# Patient Record
Sex: FEMALE | Race: WHITE | Employment: FULL TIME | URBAN - METROPOLITAN AREA
[De-identification: names, ages, dates, MRNs, and addresses within clinical notes are randomized per-mention and may not be internally consistent; named-entity substitution may affect disease eponyms.]

---

## 2017-01-30 ENCOUNTER — HOSPITAL ENCOUNTER (OUTPATIENT)
Dept: PHYSICAL THERAPY | Age: 37
Discharge: HOME | End: 2017-01-31
Admitting: NEUROLOGICAL SURGERY

## 2017-02-16 ENCOUNTER — HOSPITAL ENCOUNTER (OUTPATIENT)
Dept: PHYSICAL THERAPY | Age: 37
Discharge: HOME | End: 2017-02-16
Admitting: NEUROLOGICAL SURGERY

## 2017-02-27 ENCOUNTER — HOSPITAL ENCOUNTER (OUTPATIENT)
Dept: PHYSICAL THERAPY | Age: 37
Discharge: HOME | End: 2017-02-27
Admitting: NEUROLOGICAL SURGERY

## 2017-03-08 ENCOUNTER — HOSPITAL ENCOUNTER (OUTPATIENT)
Dept: PHYSICAL THERAPY | Age: 37
Discharge: HOME | End: 2017-03-08
Admitting: NEUROLOGICAL SURGERY

## 2017-03-13 ENCOUNTER — HOSPITAL ENCOUNTER (OUTPATIENT)
Dept: PHYSICAL THERAPY | Age: 37
Discharge: HOME | End: 2017-03-13
Admitting: NEUROLOGICAL SURGERY

## 2017-03-15 ENCOUNTER — HOSPITAL ENCOUNTER (OUTPATIENT)
Dept: PHYSICAL THERAPY | Age: 37
Discharge: HOME | End: 2017-03-15
Admitting: NEUROLOGICAL SURGERY

## 2018-12-14 ENCOUNTER — APPOINTMENT (OUTPATIENT)
Dept: CT IMAGING | Age: 38
End: 2018-12-14
Payer: COMMERCIAL

## 2018-12-14 ENCOUNTER — HOSPITAL ENCOUNTER (EMERGENCY)
Age: 38
Discharge: HOME | End: 2018-12-14
Attending: EMERGENCY MEDICINE
Payer: COMMERCIAL

## 2018-12-14 VITALS
SYSTOLIC BLOOD PRESSURE: 130 MMHG | OXYGEN SATURATION: 97 % | HEIGHT: 64 IN | WEIGHT: 192 LBS | BODY MASS INDEX: 32.78 KG/M2 | HEART RATE: 80 BPM | TEMPERATURE: 98.1 F | RESPIRATION RATE: 18 BRPM | DIASTOLIC BLOOD PRESSURE: 87 MMHG

## 2018-12-14 DIAGNOSIS — M43.6 TORTICOLLIS: Primary | ICD-10-CM

## 2018-12-14 PROCEDURE — 96374 THER/PROPH/DIAG INJ IV PUSH: CPT

## 2018-12-14 PROCEDURE — 96375 TX/PRO/DX INJ NEW DRUG ADDON: CPT

## 2018-12-14 PROCEDURE — 6360000002 HC RX W HCPCS: Performed by: PHYSICIAN ASSISTANT

## 2018-12-14 PROCEDURE — 99283 EMERGENCY DEPT VISIT LOW MDM: CPT

## 2018-12-14 PROCEDURE — 6370000000 HC RX 637 (ALT 250 FOR IP): Performed by: PHYSICIAN ASSISTANT

## 2018-12-14 PROCEDURE — 72125 CT NECK SPINE W/O DYE: CPT

## 2018-12-14 RX ORDER — DIPHENHYDRAMINE HCL 25 MG
25 CAPSULE ORAL EVERY 4 HOURS PRN
Qty: 25 CAPSULE | Refills: 0 | Status: SHIPPED | OUTPATIENT
Start: 2018-12-14 | End: 2018-12-24

## 2018-12-14 RX ORDER — ORPHENADRINE CITRATE 30 MG/ML
60 INJECTION INTRAMUSCULAR; INTRAVENOUS ONCE
Status: DISCONTINUED | OUTPATIENT
Start: 2018-12-14 | End: 2018-12-14

## 2018-12-14 RX ORDER — CYCLOBENZAPRINE HCL 10 MG
10 TABLET ORAL 3 TIMES DAILY PRN
Qty: 20 TABLET | Refills: 0 | Status: SHIPPED | OUTPATIENT
Start: 2018-12-14 | End: 2018-12-21

## 2018-12-14 RX ORDER — LEVOTHYROXINE SODIUM 0.1 MG/1
100 TABLET ORAL DAILY
COMMUNITY
End: 2019-03-06 | Stop reason: SDUPTHER

## 2018-12-14 RX ORDER — KETOROLAC TROMETHAMINE 10 MG/1
10 TABLET, FILM COATED ORAL EVERY 6 HOURS PRN
Qty: 20 TABLET | Refills: 0 | Status: SHIPPED | OUTPATIENT
Start: 2018-12-14 | End: 2019-01-23

## 2018-12-14 RX ORDER — METHYLPREDNISOLONE 4 MG/1
TABLET ORAL
Qty: 1 KIT | Refills: 0 | Status: SHIPPED | OUTPATIENT
Start: 2018-12-14 | End: 2018-12-20

## 2018-12-14 RX ORDER — DIPHENHYDRAMINE HYDROCHLORIDE 50 MG/ML
12.5 INJECTION INTRAMUSCULAR; INTRAVENOUS ONCE
Status: COMPLETED | OUTPATIENT
Start: 2018-12-14 | End: 2018-12-14

## 2018-12-14 RX ORDER — KETOROLAC TROMETHAMINE 30 MG/ML
30 INJECTION, SOLUTION INTRAMUSCULAR; INTRAVENOUS ONCE
Status: COMPLETED | OUTPATIENT
Start: 2018-12-14 | End: 2018-12-14

## 2018-12-14 RX ORDER — CYCLOBENZAPRINE HCL 10 MG
10 TABLET ORAL ONCE
Status: COMPLETED | OUTPATIENT
Start: 2018-12-14 | End: 2018-12-14

## 2018-12-14 RX ADMIN — CYCLOBENZAPRINE HYDROCHLORIDE 10 MG: 10 TABLET, FILM COATED ORAL at 17:08

## 2018-12-14 RX ADMIN — KETOROLAC TROMETHAMINE 30 MG: 30 INJECTION, SOLUTION INTRAMUSCULAR at 17:05

## 2018-12-14 RX ADMIN — DIPHENHYDRAMINE HYDROCHLORIDE 12.5 MG: 50 INJECTION, SOLUTION INTRAMUSCULAR; INTRAVENOUS at 17:05

## 2018-12-14 ASSESSMENT — PAIN SCALES - GENERAL
PAINLEVEL_OUTOF10: 10
PAINLEVEL_OUTOF10: 8

## 2018-12-14 ASSESSMENT — PAIN DESCRIPTION - ORIENTATION: ORIENTATION: RIGHT

## 2018-12-14 ASSESSMENT — PAIN DESCRIPTION - LOCATION: LOCATION: NECK

## 2018-12-14 NOTE — ED PROVIDER NOTES
[address] ED  eMERGENCY dEPARTMENT eNCOUnter        Pt Name: Brien Chiang  MRN: [id number]  [place] 1980  Date of evaluation: 12/14/2018  Provider: Artem Andrea PA-C  PCP: Karyn Rodríguez MD    This patient WAS seen and evaluated by the attending physician Erin Souza MD.            [address] [facility]       Chief Complaint   Patient presents with    Neck Pain     Pt c/o neck pain that started yesterday and has just gotten worse, Pt was sent from pcp to evaluate here. Pt cannot move neck and cannot lift arms. Pt's head is slight turned toward the left. HISTORY OF PRESENT ILLNESS   (Location/Symptom, Timing/Onset, Context/Setting, Quality, Duration, Modifying Factors, Severity)  Note limiting factors. Brien Chiang is a 41 y.o. female presents to the emergency department with right-sided neck pain that has been present since yesterday around 3 PM.  The patient denies any strenuous activity or known injury. She states that her pain is an 8 out of 10 sharp pain that is worse with movement or when it spasms. She has had 2 disc replacements 2 years ago. She states she went to her PCP who referred her here. She has taken over-the-counter medications without any relief. Denies any numbness, tingling, weakness, saddle paresthesias, bowel or bladder incontinence/retention, recent spine surgery, diabetes and recreational drugs. Nursing Notes were all reviewed and agreed with or any disagreements were addressed  in the HPI. REVIEW OF SYSTEMS    (2-9 systems for level 4, 10 or more for level 5)     Review of Systems    Positives and Pertinent negatives as per HPI. Except as noted abovein the ROS, all other systems were reviewed and negative.        PAST MEDICAL HISTORY     Past Medical History:   Diagnosis Date    Anxiety     Herniated cervical disc     MRSA infection     on back 2010         SURGICAL HISTORY     Past Surgical History:   Procedure Laterality Date eye exhibits no discharge. Left eye exhibits no discharge. No scleral icterus. Neck: Normal range of motion. Neck supple. Cardiovascular: Normal rate, regular rhythm, normal heart sounds and intact distal pulses. Exam reveals no gallop and no friction rub. No murmur heard. Pulmonary/Chest: Effort normal and breath sounds normal. No respiratory distress. She has no wheezes. She has no rales. Musculoskeletal:        Cervical back: She exhibits decreased range of motion, tenderness (Along the right sternocleidomastoid), pain and spasm (Right sternocleidomastoid). She exhibits no bony tenderness, no swelling, no edema, no deformity and no laceration. Thoracic back: Normal.        Lumbar back: Normal.   Neurological: She is alert and oriented to person, place, and time. She has normal strength. She displays no atrophy and no tremor. No sensory deficit. She exhibits normal muscle tone. She displays no seizure activity. Gait normal.   Reflex Scores:       Tricep reflexes are 2+ on the right side and 2+ on the left side. Bicep reflexes are 2+ on the right side and 2+ on the left side. Brachioradialis reflexes are 2+ on the right side and 2+ on the left side. Patellar reflexes are 2+ on the right side and 2+ on the left side. Achilles reflexes are 2+ on the right side and 2+ on the left side. Skin: Skin is warm and dry. She is not diaphoretic. No pallor. Psychiatric: She has a normal mood and affect. Her behavior is normal. Thought content normal.   Nursing note and vitals reviewed. DIAGNOSTIC RESULTS   LABS:    Labs Reviewed - No data to display    All other labs were within normal range or not returned as of this dictation. EKG: All EKG's are interpreted by the Emergency Department Physician who either signs orCo-signs this chart in the absence of a cardiologist.  Please see their note for interpretation of EKG.       RADIOLOGY:   Non-plain film images such as CT, Ultrasound and MRI are read by the lesly Krause All radiographic images are visualized andpreliminarily interpreted by the  ED Provider with the below findings:        Interpretation perthe Radiologist below, if available at the time of this note:    CT Cervical Spine WO Contrast   Final Result   Postoperative change of the cervical spine at C4-C5 and C5-C6. No evidence   of spondylolisthesis or gross fracture. Ct Cervical Spine Wo Contrast    Result Date: 12/14/2018  EXAMINATION: CT OF THE CERVICAL SPINE WITHOUT CONTRAST 12/14/2018 5:01 pm TECHNIQUE: CT of the cervical spine was performed without the administration of intravenous contrast. Multiplanar reformatted images are provided for review. Dose modulation, iterative reconstruction, and/or weight based adjustment of the mA/kV was utilized to reduce the radiation dose to as low as reasonably achievable. COMPARISON: None. HISTORY: ORDERING SYSTEM PROVIDED HISTORY: right sided neck pain TECHNOLOGIST PROVIDED HISTORY: If patient is on cardiac monitor and/or pulse ox, they may be taken off cardiac monitor and pulse ox, left on O2 if currently on. All monitors reattached when patient returns to room. Ordering Physician Provided Reason for Exam: right sided neck pain, pt denies any injury Acuity: Acute Type of Exam: Initial Relevant Medical/Surgical History: neck sx x 2 yrs ago FINDINGS: BONES/ALIGNMENT: Multiple contiguous axial images were obtained of the cervical spine from the skullbase through T2. Sagittal and coronal reformats were obtained. Radiopaque disc spacers are seen at C4-C5 and C5-C6 from prior surgery. No evidence of spondylolisthesis. No marked vertebral body height loss. Spurring is seen at the interface of the dens and the anterior arch of C1. The facets are aligned. No gross fracture is seen. SOFT TISSUES: No airspace disease of the lung apices. Shotty cervical lymph nodes are seen bilaterally.      Postoperative change of the

## 2018-12-16 NOTE — ED PROVIDER NOTES
I independently performed a history and physical on Leita Leyden. All diagnostic, treatment, and disposition decisions were made by myself in conjunction with the advanced practice provider. Briefly, this is a 41 y.o. female here for pain to the right side of the neck. Patient states that she is limited to the right. Patient denies any injury. .    On exam, patient has a benign physical examination, and we did provide the patient with medications to help with her symptoms including antihistamines. Patient was improved and will be started on medications to help at home    For further details of [facility] emergency department encounter, please see documentation by advanced practice provider  Pal Carrion.         Leda Brock MD  12/16/18 [id number]

## 2019-01-23 ENCOUNTER — OFFICE VISIT (OUTPATIENT)
Dept: ENDOCRINOLOGY | Age: 39
End: 2019-01-23
Payer: COMMERCIAL

## 2019-01-23 VITALS
SYSTOLIC BLOOD PRESSURE: 124 MMHG | OXYGEN SATURATION: 98 % | RESPIRATION RATE: 14 BRPM | BODY MASS INDEX: 33.49 KG/M2 | DIASTOLIC BLOOD PRESSURE: 81 MMHG | HEART RATE: 75 BPM | TEMPERATURE: 97.2 F | HEIGHT: 64 IN | WEIGHT: 196.2 LBS

## 2019-01-23 DIAGNOSIS — E01.0 THYROMEGALY: ICD-10-CM

## 2019-01-23 DIAGNOSIS — E03.9 ACQUIRED HYPOTHYROIDISM: Primary | ICD-10-CM

## 2019-01-23 PROCEDURE — 1036F TOBACCO NON-USER: CPT | Performed by: INTERNAL MEDICINE

## 2019-01-23 PROCEDURE — G8417 CALC BMI ABV UP PARAM F/U: HCPCS | Performed by: INTERNAL MEDICINE

## 2019-01-23 PROCEDURE — G8427 DOCREV CUR MEDS BY ELIG CLIN: HCPCS | Performed by: INTERNAL MEDICINE

## 2019-01-23 PROCEDURE — 99203 OFFICE O/P NEW LOW 30 MIN: CPT | Performed by: INTERNAL MEDICINE

## 2019-01-23 PROCEDURE — G8484 FLU IMMUNIZE NO ADMIN: HCPCS | Performed by: INTERNAL MEDICINE

## 2019-02-23 ENCOUNTER — HOSPITAL ENCOUNTER (OUTPATIENT)
Dept: ULTRASOUND IMAGING | Age: 39
Discharge: HOME | End: 2019-02-23
Payer: COMMERCIAL

## 2019-02-23 ENCOUNTER — HOSPITAL ENCOUNTER (OUTPATIENT)
Age: 39
Discharge: HOME | End: 2019-02-23
Payer: COMMERCIAL

## 2019-02-23 DIAGNOSIS — E03.9 ACQUIRED HYPOTHYROIDISM: ICD-10-CM

## 2019-02-23 DIAGNOSIS — E01.0 THYROMEGALY: ICD-10-CM

## 2019-02-23 LAB
T4 FREE: 1.5 NG/DL (ref 0.9–1.8)
THYROID PEROXIDASE (TPO) ABS: 227 IU/ML
TSH SERPL DL<=0.05 MIU/L-ACNC: 0.48 UIU/ML (ref 0.27–4.2)

## 2019-02-23 PROCEDURE — 36415 COLL VENOUS BLD VENIPUNCTURE: CPT

## 2019-02-23 PROCEDURE — 84443 ASSAY THYROID STIM HORMONE: CPT

## 2019-02-23 PROCEDURE — 76536 US EXAM OF HEAD AND NECK: CPT

## 2019-02-23 PROCEDURE — 86376 MICROSOMAL ANTIBODY EACH: CPT

## 2019-02-23 PROCEDURE — 84439 ASSAY OF FREE THYROXINE: CPT

## 2019-02-27 ENCOUNTER — TELEPHONE (OUTPATIENT)
Dept: ENDOCRINOLOGY | Age: 39
End: 2019-02-27

## 2019-03-06 ENCOUNTER — OFFICE VISIT (OUTPATIENT)
Dept: ENDOCRINOLOGY | Age: 39
End: 2019-03-06
Payer: COMMERCIAL

## 2019-03-06 VITALS
SYSTOLIC BLOOD PRESSURE: 137 MMHG | HEART RATE: 81 BPM | HEIGHT: 64 IN | OXYGEN SATURATION: 100 % | WEIGHT: 194.6 LBS | BODY MASS INDEX: 33.22 KG/M2 | DIASTOLIC BLOOD PRESSURE: 84 MMHG

## 2019-03-06 DIAGNOSIS — E66.9 CLASS 1 OBESITY WITH SERIOUS COMORBIDITY AND BODY MASS INDEX (BMI) OF 34.0 TO 34.9 IN ADULT, UNSPECIFIED OBESITY TYPE: ICD-10-CM

## 2019-03-06 DIAGNOSIS — E03.9 ACQUIRED HYPOTHYROIDISM: Primary | ICD-10-CM

## 2019-03-06 DIAGNOSIS — E55.9 VITAMIN D DEFICIENCY: ICD-10-CM

## 2019-03-06 PROCEDURE — G8484 FLU IMMUNIZE NO ADMIN: HCPCS | Performed by: NURSE PRACTITIONER

## 2019-03-06 PROCEDURE — G8427 DOCREV CUR MEDS BY ELIG CLIN: HCPCS | Performed by: NURSE PRACTITIONER

## 2019-03-06 PROCEDURE — 99203 OFFICE O/P NEW LOW 30 MIN: CPT | Performed by: NURSE PRACTITIONER

## 2019-03-06 PROCEDURE — 1036F TOBACCO NON-USER: CPT | Performed by: NURSE PRACTITIONER

## 2019-03-06 PROCEDURE — G8417 CALC BMI ABV UP PARAM F/U: HCPCS | Performed by: NURSE PRACTITIONER

## 2019-03-06 RX ORDER — LEVOTHYROXINE SODIUM 0.1 MG/1
100 TABLET ORAL DAILY
Qty: 90 TABLET | Refills: 2 | Status: SHIPPED | OUTPATIENT
Start: 2019-03-06 | End: 2020-03-10 | Stop reason: SDUPTHER

## 2019-03-07 ASSESSMENT — ENCOUNTER SYMPTOMS
COLOR CHANGE: 0
ABDOMINAL PAIN: 0
EYE PAIN: 0
BACK PAIN: 0
SHORTNESS OF BREATH: 0
CONSTIPATION: 0
DIARRHEA: 0

## 2019-04-01 ENCOUNTER — HOSPITAL ENCOUNTER (OUTPATIENT)
Age: 39
Discharge: HOME | End: 2019-04-01
Payer: COMMERCIAL

## 2019-04-01 DIAGNOSIS — E66.9 CLASS 1 OBESITY WITH SERIOUS COMORBIDITY AND BODY MASS INDEX (BMI) OF 34.0 TO 34.9 IN ADULT, UNSPECIFIED OBESITY TYPE: ICD-10-CM

## 2019-04-01 DIAGNOSIS — E55.9 VITAMIN D DEFICIENCY: ICD-10-CM

## 2019-04-01 LAB
A/G RATIO: 1.2 (ref 1.1–2.2)
ALBUMIN SERPL-MCNC: 4.1 G/DL (ref 3.4–5)
ALP BLD-CCNC: 72 U/L (ref 40–129)
ALT SERPL-CCNC: 14 U/L (ref 10–40)
ANION GAP SERPL CALCULATED.3IONS-SCNC: 10 MMOL/L (ref 3–16)
AST SERPL-CCNC: 14 U/L (ref 15–37)
BILIRUB SERPL-MCNC: 0.3 MG/DL (ref 0–1)
BUN BLDV-MCNC: 16 MG/DL (ref 7–20)
CALCIUM SERPL-MCNC: 9 MG/DL (ref 8.3–10.6)
CHLORIDE BLD-SCNC: 107 MMOL/L (ref 99–110)
CHOLESTEROL, TOTAL: 178 MG/DL (ref 0–199)
CO2: 21 MMOL/L (ref 21–32)
CREAT SERPL-MCNC: <0.5 MG/DL (ref 0.6–1.1)
ESTIMATED AVERAGE GLUCOSE: 96.8 MG/DL
GFR AFRICAN AMERICAN: >60
GFR NON-AFRICAN AMERICAN: >60
GLOBULIN: 3.3 G/DL
GLUCOSE BLD-MCNC: 99 MG/DL (ref 70–99)
HBA1C MFR BLD: 5 %
HDLC SERPL-MCNC: 46 MG/DL (ref 40–60)
LDL CHOLESTEROL CALCULATED: 118 MG/DL
POTASSIUM SERPL-SCNC: 3.8 MMOL/L (ref 3.5–5.1)
SODIUM BLD-SCNC: 138 MMOL/L (ref 136–145)
TOTAL PROTEIN: 7.4 G/DL (ref 6.4–8.2)
TRIGL SERPL-MCNC: 70 MG/DL (ref 0–150)
VITAMIN D 25-HYDROXY: 25.5 NG/ML
VLDLC SERPL CALC-MCNC: 14 MG/DL

## 2019-04-01 PROCEDURE — 80053 COMPREHEN METABOLIC PANEL: CPT

## 2019-04-01 PROCEDURE — 82306 VITAMIN D 25 HYDROXY: CPT

## 2019-04-01 PROCEDURE — 83036 HEMOGLOBIN GLYCOSYLATED A1C: CPT

## 2019-04-01 PROCEDURE — 80061 LIPID PANEL: CPT

## 2019-04-01 PROCEDURE — 36415 COLL VENOUS BLD VENIPUNCTURE: CPT

## 2019-04-03 ENCOUNTER — OFFICE VISIT (OUTPATIENT)
Dept: ENDOCRINOLOGY | Age: 39
End: 2019-04-03
Payer: COMMERCIAL

## 2019-04-03 VITALS
RESPIRATION RATE: 16 BRPM | OXYGEN SATURATION: 98 % | DIASTOLIC BLOOD PRESSURE: 86 MMHG | SYSTOLIC BLOOD PRESSURE: 132 MMHG | HEIGHT: 64 IN | HEART RATE: 102 BPM | BODY MASS INDEX: 31.41 KG/M2 | WEIGHT: 184 LBS

## 2019-04-03 DIAGNOSIS — E78.49 OTHER HYPERLIPIDEMIA: ICD-10-CM

## 2019-04-03 DIAGNOSIS — E66.9 CLASS 1 OBESITY WITH SERIOUS COMORBIDITY IN ADULT, UNSPECIFIED BMI, UNSPECIFIED OBESITY TYPE: ICD-10-CM

## 2019-04-03 DIAGNOSIS — E55.9 VITAMIN D DEFICIENCY: ICD-10-CM

## 2019-04-03 DIAGNOSIS — E03.9 ACQUIRED HYPOTHYROIDISM: Primary | ICD-10-CM

## 2019-04-03 PROCEDURE — 99213 OFFICE O/P EST LOW 20 MIN: CPT | Performed by: NURSE PRACTITIONER

## 2019-04-03 PROCEDURE — G8417 CALC BMI ABV UP PARAM F/U: HCPCS | Performed by: NURSE PRACTITIONER

## 2019-04-03 PROCEDURE — G8427 DOCREV CUR MEDS BY ELIG CLIN: HCPCS | Performed by: NURSE PRACTITIONER

## 2019-04-03 PROCEDURE — 1036F TOBACCO NON-USER: CPT | Performed by: NURSE PRACTITIONER

## 2019-04-03 RX ORDER — PHENTERMINE HYDROCHLORIDE 37.5 MG/1
37.5 TABLET ORAL
Qty: 30 TABLET | Refills: 0 | Status: SHIPPED | OUTPATIENT
Start: 2019-04-03 | End: 2019-04-24 | Stop reason: SDUPTHER

## 2019-04-03 ASSESSMENT — ENCOUNTER SYMPTOMS
COLOR CHANGE: 0
ABDOMINAL PAIN: 0
DIARRHEA: 0
EYE PAIN: 0
BACK PAIN: 0
CONSTIPATION: 0
SHORTNESS OF BREATH: 0

## 2019-04-03 NOTE — ASSESSMENT & PLAN NOTE
Lab Results   Component Value Date    TSH 0.48 02/23/2019    T4FREE 1.5 02/23/2019     Continue at same dose  Will review labs at next visit with Dr Maria Isabel Canela

## 2019-04-03 NOTE — PROGRESS NOTES
Endocrinology  [place], [place]  Phone: [phone]   FAX: [phone]    Khai Melvin is a 41 y.o. female who presents for evaluation of  hypothyroidism. Thyroid:   Khai Melvin has a h/o hypothyroidism for > 2 years. Referring Provider: Chelo Ybarra MD     Last A1C:   Lab Results   Component Value Date    LABA1C 5.0 04/01/2019     Last BP Readings:   BP Readings from Last 3 Encounters:   04/03/19 132/86   03/13/19 126/82   03/06/19 137/84     Last LDL:   Lab Results   Component Value Date    LDLCALC 118 (H) 04/01/2019     Aspirin Use: no    Tobacco/Alcohol History:   Tobacco Use    Smoking status: Former Smoker     Packs/day: 0.25     Years: 17.00     Pack years: 4.25     Types: Cigarettes     Start date: 5/1/2014    Smokeless tobacco: Never Used   Substance and Sexual Activity    Alcohol use: Yes     Alcohol/week: 7.2 oz     Types: 12 Cans of beer per week     Comment: 1-2 times a month states drank 1 glass wine 6/25/13 evening and prior to that 3 months ago    Drug use: No     Comment: used pot as teen tried cocaine as teen     Patient has been on varying dosages of levothyroxine and is currently on levothyroxine 100 mcg. Confirms that it is taken in early am on an empty stomach. Clarified that nothing to eat for at least 30 minutes after and no iron or calcium for at.least 3-4 hours after. Diagnosis was intially during last pregnancy but was euthyroid shortly after and has been conssitently taking eds for 2 years     Current symptoms include fatigue, weight gain, constipation, change in skin,  nails, or hair, goiter. Reports a near 40 lbs weight gain over the past 2 years that has not responded to dietary management. --> reports a 10+ lbs weight loss in the past 3 weeks  Patient denies swelling, anxiousness, feeling excessive energy, tremulousness, palpitations, weight loss, diarrhea, heat intolerance.      Obstructive symptoms  - Change in voice no  - Trouble swallowing no    Predisposing factors for thyroid disease  Exposure to radiation (neck) : no  Exposure to iodine : no  FH of thyroid disease : no  FH of thyroid or other cancers: no     Dietary Regimen  BF: eggs/coffee  Lunch: skips/late breakfast  Dinner: protein/vegetables/carbs  Beverages: coffee/water/rarely alcohol/  Snacks: nuts    Lab Results   Component Value Date    TSH 0.48 02/23/2019    TSH 3.69 06/05/2014    T4FREE 1.5 02/23/2019    T4FREE 1.1 06/05/2014     Component      Latest Ref Rng & Units 2/23/2019   THYROID PEROXIDASE (TPO) ABS      <34 IU/mL 227 (H)     EXAMINATION:  THYROID ULTRASOUND 2/23/2019    COMPARISON: None    HISTORY:  ORDERING SYSTEM PROVIDED HISTORY: Acquired hypothyroidism   TECHNOLOGIST PROVIDED HISTORY:  Reason for exam:->Hypothyroidism. Thyromegaly. FINDINGS:  Right thyroid lobe:  4.0 x 1.5 x 1.2 cm  Left thyroid lobe:  4.2 x 1.3 x 1.1 cm  Isthmus:  3 mm  Thyroid Gland:  Thyroid gland demonstrates normal echotexture and vascularity. Nodules: No thyroid nodules are present. Cervical lymphadenopathy: No abnormal lymph nodes in the imaged portions of  the neck. Impression   Unremarkable thyroid ultrasound. Vitamin D deficiency  Ongoing concerns with fatigue and insomnia  Component      Latest Ref Rng & Units 4/1/2019   Vit D, 25-Hydroxy      >=30 ng/mL 25.5 (L)     Hyperlipidemia  Not on a statin currently, monitoring per her diet currently    Component      Latest Ref Rng & Units 4/1/2019 6/5/2014   Cholesterol, Total      0 - 199 mg/dL 178 178   Triglycerides      0 - 150 mg/dL 70 65   HDL Cholesterol      40 - 60 mg/dL 46 65 (H)   LDL Calculated      <100 mg/dL 118 (H) 100 (H)   VLDL Cholesterol Calculated      Not Established mg/dL 14 13     Past Medical History:   Diagnosis Date    Anxiety     Herniated cervical disc     MRSA infection     on back 2010     No family history on file.   Current Outpatient Medications   Medication Sig Dispense Refill    phentermine (ADIPEX-P) 37.5 MG tablet Take 1 tablet by mouth every morning (before breakfast) for 30 days. 30 tablet 0    phentermine (ADIPEX-P) 37.5 MG tablet Take 1 tablet by mouth every morning (before breakfast) for 30 days. 30 tablet 0    levothyroxine (SYNTHROID) 100 MCG tablet Take 1 tablet by mouth Daily 90 tablet 2     No current facility-administered medications for this visit. Review of Systems   Constitutional: Negative for activity change, appetite change, diaphoresis, fatigue and unexpected weight change. Eyes: Negative for pain and visual disturbance. Respiratory: Negative for shortness of breath. Cardiovascular: Negative for palpitations and leg swelling. Gastrointestinal: Negative for abdominal pain, constipation and diarrhea. Endocrine: Negative for cold intolerance, heat intolerance, polydipsia, polyphagia and polyuria. Musculoskeletal: Negative for back pain, gait problem, myalgias and neck pain. Skin: Negative for color change and pallor. Neurological: Negative for dizziness, weakness, light-headedness and headaches. Hematological: Does not bruise/bleed easily. Psychiatric/Behavioral: Negative for sleep disturbance. The patient is not nervous/anxious and is not hyperactive. Vitals:    04/03/19 1136   BP: 132/86   Site: Left Upper Arm   Position: Sitting   Pulse: 102   Resp: 16   SpO2: 98%   Weight: 184 lb (83.5 kg)   Height: 5' 4\" (1.626 m)     Physical Exam   Constitutional: She is oriented to person, place, and time. She appears well-developed and well-nourished. HENT:   Head: Normocephalic and atraumatic. Eyes: Pupils are equal, round, and reactive to light. Conjunctivae and EOM are normal.   No evidence of proptosis   Neck: Normal range of motion. Neck supple. No thyromegaly present. Cardiovascular: Normal rate and regular rhythm. Pulmonary/Chest: Effort normal and breath sounds normal.   Abdominal: Soft. Bowel sounds are normal.   Musculoskeletal: Normal range of motion.  She exhibits no edema or tenderness. Neurological: She is alert and oriented to person, place, and time. No tremors noted on outstretched arms   Skin: Skin is warm and dry. She is not diaphoretic. No skin changes b/l shins   Psychiatric: She has a normal mood and affect. Her behavior is normal. Judgment and thought content normal.     Assessment  Maria Elena Feliciano is a 41 y.o. female with abnormal thyroid test:   Previous test were normal, will repeat evaluation by checking thyroid test and antibodies  Interested in weight loss advice: discussed starting a low carb protocol  Will review labs, and medical records, currently not available from [facility] doing well on Phentermine and documents a 10 + lbs weight loss in 3 weeks via diet and medication    Plan  Problem List Items Addressed This Visit     Acquired hypothyroidism - Primary     Lab Results   Component Value Date    TSH 0.48 02/23/2019    T4FREE 1.5 02/23/2019     Continue at same dose  Will review labs at next visit with Dr Yarelis Cabrera            Relevant Medications    phentermine (ADIPEX-P) 37.5 MG tablet    Obesity with serious comorbidity    Relevant Medications    phentermine (ADIPEX-P) 37.5 MG tablet    Other hyperlipidemia    Relevant Medications    phentermine (ADIPEX-P) 37.5 MG tablet    Vitamin D deficiency      Greater than 20 minutes spent directly counseling patient about topics listed above (such as lifestyle modifications, preventative screenings and/or disease related processes)    Return in about 1 month (around 5/3/2019).

## 2019-04-22 ENCOUNTER — TELEPHONE (OUTPATIENT)
Dept: ENDOCRINOLOGY | Age: 39
End: 2019-04-22

## 2019-04-22 NOTE — TELEPHONE ENCOUNTER
Patient is having a drug test for work and is asking that we write a letter that she is under our care and that she is being prescribed Adipex. She will call back later if this needs to be faxed. If not, she is asking to pick this up at her Wednesday appointment in [place].

## 2019-04-24 ENCOUNTER — OFFICE VISIT (OUTPATIENT)
Dept: ENDOCRINOLOGY | Age: 39
End: 2019-04-24
Payer: COMMERCIAL

## 2019-04-24 VITALS
SYSTOLIC BLOOD PRESSURE: 122 MMHG | DIASTOLIC BLOOD PRESSURE: 81 MMHG | OXYGEN SATURATION: 98 % | HEIGHT: 64 IN | WEIGHT: 177.2 LBS | BODY MASS INDEX: 30.25 KG/M2 | HEART RATE: 75 BPM

## 2019-04-24 DIAGNOSIS — E03.9 ACQUIRED HYPOTHYROIDISM: ICD-10-CM

## 2019-04-24 DIAGNOSIS — E66.9 CLASS 1 OBESITY WITH SERIOUS COMORBIDITY IN ADULT, UNSPECIFIED BMI, UNSPECIFIED OBESITY TYPE: ICD-10-CM

## 2019-04-24 DIAGNOSIS — E78.49 OTHER HYPERLIPIDEMIA: Primary | ICD-10-CM

## 2019-04-24 PROCEDURE — G8417 CALC BMI ABV UP PARAM F/U: HCPCS | Performed by: NURSE PRACTITIONER

## 2019-04-24 PROCEDURE — 99213 OFFICE O/P EST LOW 20 MIN: CPT | Performed by: NURSE PRACTITIONER

## 2019-04-24 PROCEDURE — G8427 DOCREV CUR MEDS BY ELIG CLIN: HCPCS | Performed by: NURSE PRACTITIONER

## 2019-04-24 PROCEDURE — 1036F TOBACCO NON-USER: CPT | Performed by: NURSE PRACTITIONER

## 2019-04-24 RX ORDER — PHENTERMINE HYDROCHLORIDE 37.5 MG/1
37.5 TABLET ORAL
Qty: 30 TABLET | Refills: 0 | Status: SHIPPED | OUTPATIENT
Start: 2019-04-24 | End: 2019-05-24

## 2019-04-24 ASSESSMENT — ENCOUNTER SYMPTOMS
ABDOMINAL PAIN: 0
BACK PAIN: 0
CONSTIPATION: 0
SHORTNESS OF BREATH: 0
DIARRHEA: 0
EYE PAIN: 0
COLOR CHANGE: 0

## 2019-04-24 NOTE — PROGRESS NOTES
Endocrinology  [place], [place]  Phone: [phone]   FAX: [phone]    Manasa Velasquez is a 41 y.o. female who presents for evaluation of  hypothyroidism. Thyroid:   Manasa Velasquez has a h/o hypothyroidism for > 2 years. Referring Provider: Meghna Macedo MD     Last A1C:   Lab Results   Component Value Date    LABA1C 5.0 04/01/2019     Last BP Readings:   BP Readings from Last 3 Encounters:   04/24/19 122/81   04/03/19 132/86   03/13/19 126/82     Last LDL:   Lab Results   Component Value Date    LDLCALC 118 (H) 04/01/2019     Aspirin Use: no    Tobacco/Alcohol History:   Tobacco Use    Smoking status: Former Smoker     Packs/day: 0.25     Years: 17.00     Pack years: 4.25     Types: Cigarettes     Start date: 5/1/2014    Smokeless tobacco: Never Used   Substance and Sexual Activity    Alcohol use: Yes     Alcohol/week: 7.2 oz     Types: 12 Cans of beer per week     Comment: 1-2 times a month states drank 1 glass wine 6/25/13 evening and prior to that 3 months ago    Drug use: No     Comment: used pot as teen tried cocaine as teen     Patient has been on varying dosages of levothyroxine and is currently on levothyroxine 100 mcg. Confirms that it is taken in early am on an empty stomach. Clarified that nothing to eat for at least 30 minutes after and no iron or calcium for at.least 3-4 hours after. Diagnosis was intially during last pregnancy but was euthyroid shortly after and has been conssitently taking eds for 2 years     Current symptoms include fatigue, weight gain, constipation, change in skin,  nails, or hair, goiter. Reports a near 40 lbs weight gain over the past 2 years that has not responded to dietary management. --> reports a 10+ lbs weight loss in the past 3 weeks  Patient denies swelling, anxiousness, feeling excessive energy, tremulousness, palpitations, weight loss, diarrhea, heat intolerance.      Obstructive symptoms  - Change in voice no  - Trouble swallowing no    Predisposing factors for thyroid disease  Exposure to radiation (neck) : no  Exposure to iodine : no  FH of thyroid disease : no  FH of thyroid or other cancers: no     Dietary Regimen  BF: eggs/coffee  Lunch: skips/late breakfast  Dinner: protein/vegetables/carbs  Beverages: coffee/water/rarely alcohol/  Snacks: nuts    Lab Results   Component Value Date    TSH 0.48 02/23/2019    TSH 3.69 06/05/2014    T4FREE 1.5 02/23/2019    T4FREE 1.1 06/05/2014     Component      Latest Ref Rng & Units 2/23/2019   THYROID PEROXIDASE (TPO) ABS      <34 IU/mL 227 (H)     EXAMINATION:  THYROID ULTRASOUND 2/23/2019    COMPARISON: None    HISTORY:  ORDERING SYSTEM PROVIDED HISTORY: Acquired hypothyroidism   TECHNOLOGIST PROVIDED HISTORY:  Reason for exam:->Hypothyroidism. Thyromegaly. FINDINGS:  Right thyroid lobe:  4.0 x 1.5 x 1.2 cm  Left thyroid lobe:  4.2 x 1.3 x 1.1 cm  Isthmus:  3 mm  Thyroid Gland:  Thyroid gland demonstrates normal echotexture and vascularity. Nodules: No thyroid nodules are present. Cervical lymphadenopathy: No abnormal lymph nodes in the imaged portions of  the neck. Impression   Unremarkable thyroid ultrasound. Vitamin D deficiency  Ongoing concerns with fatigue and insomnia  Component      Latest Ref Rng & Units 4/1/2019   Vit D, 25-Hydroxy      >=30 ng/mL 25.5 (L)     Hyperlipidemia  Not on a statin currently, monitoring per her diet currently    Component      Latest Ref Rng & Units 4/1/2019 6/5/2014   Cholesterol, Total      0 - 199 mg/dL 178 178   Triglycerides      0 - 150 mg/dL 70 65   HDL Cholesterol      40 - 60 mg/dL 46 65 (H)   LDL Calculated      <100 mg/dL 118 (H) 100 (H)   VLDL Cholesterol Calculated      Not Established mg/dL 14 13     Past Medical History:   Diagnosis Date    Anxiety     Herniated cervical disc     MRSA infection     on back 2010     No family history on file.   Current Outpatient Medications   Medication Sig Dispense Refill    phentermine (ADIPEX-P) 37.5 MG tablet Take 1 tablet by mouth every morning (before breakfast) for 30 days. 30 tablet 0    levothyroxine (SYNTHROID) 100 MCG tablet Take 1 tablet by mouth Daily 90 tablet 2     No current facility-administered medications for this visit. Review of Systems   Constitutional: Negative for activity change, appetite change, diaphoresis, fatigue and unexpected weight change. Eyes: Negative for pain and visual disturbance. Respiratory: Negative for shortness of breath. Cardiovascular: Negative for palpitations and leg swelling. Gastrointestinal: Negative for abdominal pain, constipation and diarrhea. Endocrine: Negative for cold intolerance, heat intolerance, polydipsia, polyphagia and polyuria. Musculoskeletal: Negative for back pain, gait problem, myalgias and neck pain. Skin: Negative for color change and pallor. Neurological: Negative for dizziness, weakness, light-headedness and headaches. Hematological: Does not bruise/bleed easily. Psychiatric/Behavioral: Negative for sleep disturbance. The patient is not nervous/anxious and is not hyperactive. Vitals:    04/24/19 1356   BP: 122/81   Site: Left Upper Arm   Position: Sitting   Cuff Size: Large Adult   Pulse: 75   SpO2: 98%   Weight: 177 lb 3.2 oz (80.4 kg)   Height: 5' 4\" (1.626 m)     Physical Exam   Constitutional: She is oriented to person, place, and time. She appears well-developed and well-nourished. HENT:   Head: Normocephalic and atraumatic. Eyes: Pupils are equal, round, and reactive to light. Conjunctivae and EOM are normal.   No evidence of proptosis   Neck: Normal range of motion. Neck supple. No thyromegaly present. Cardiovascular: Normal rate and regular rhythm. Pulmonary/Chest: Effort normal and breath sounds normal.   Abdominal: Soft. Bowel sounds are normal.   Musculoskeletal: Normal range of motion. She exhibits no edema or tenderness. Neurological: She is alert and oriented to person, place, and time.    No tremors noted on outstretched arms   Skin: Skin is warm and dry. She is not diaphoretic. No skin changes b/l shins   Psychiatric: She has a normal mood and affect. Her behavior is normal. Judgment and thought content normal.     Assessment  Lucila Wells is a 41 y.o. female with abnormal thyroid test:   Previous test were normal, will repeat evaluation by checking thyroid test and antibodies  Interested in weight loss advice: discussed starting a low carb protocol  Will review labs, and medical records, currently not available from [address] [place] [address] [place]  Reports doing well on Phentermine and documents a 22 + lbs weight loss in 8 weeks via diet and medication    Plan  Problem List Items Addressed This Visit     Acquired hypothyroidism    Relevant Medications    phentermine (ADIPEX-P) 37.5 MG tablet    Other Relevant Orders    T4, Free    TSH without Reflex    Obesity with serious comorbidity    Relevant Medications    phentermine (ADIPEX-P) 37.5 MG tablet    Other hyperlipidemia - Primary    Relevant Medications    phentermine (ADIPEX-P) 37.5 MG tablet      Greater than 20 minutes spent directly counseling patient about topics listed above (such as lifestyle modifications, preventative screenings and/or disease related processes)    No follow-ups on file.

## 2019-05-01 ENCOUNTER — HOSPITAL ENCOUNTER (EMERGENCY)
Age: 39
Discharge: HOME | End: 2019-05-01
Attending: EMERGENCY MEDICINE
Payer: COMMERCIAL

## 2019-05-01 VITALS
BODY MASS INDEX: 29.71 KG/M2 | SYSTOLIC BLOOD PRESSURE: 135 MMHG | HEIGHT: 64 IN | TEMPERATURE: 98 F | RESPIRATION RATE: 19 BRPM | OXYGEN SATURATION: 100 % | WEIGHT: 174 LBS | DIASTOLIC BLOOD PRESSURE: 84 MMHG | HEART RATE: 100 BPM

## 2019-05-01 DIAGNOSIS — R07.9 CHEST PAIN, UNSPECIFIED TYPE: Primary | ICD-10-CM

## 2019-05-01 DIAGNOSIS — F41.1 GENERALIZED ANXIETY DISORDER WITH PANIC ATTACKS: ICD-10-CM

## 2019-05-01 DIAGNOSIS — F41.0 GENERALIZED ANXIETY DISORDER WITH PANIC ATTACKS: ICD-10-CM

## 2019-05-01 LAB
A/G RATIO: 1.8 (ref 1.1–2.2)
ALBUMIN SERPL-MCNC: 4.9 G/DL (ref 3.4–5)
ALP BLD-CCNC: 74 U/L (ref 40–129)
ALT SERPL-CCNC: 16 U/L (ref 10–40)
ANION GAP SERPL CALCULATED.3IONS-SCNC: 13 MMOL/L (ref 3–16)
AST SERPL-CCNC: 17 U/L (ref 15–37)
BASOPHILS ABSOLUTE: 0.1 K/UL (ref 0–0.2)
BASOPHILS RELATIVE PERCENT: 1.2 %
BILIRUB SERPL-MCNC: 0.5 MG/DL (ref 0–1)
BUN BLDV-MCNC: 12 MG/DL (ref 7–20)
CALCIUM SERPL-MCNC: 9.6 MG/DL (ref 8.3–10.6)
CHLORIDE BLD-SCNC: 103 MMOL/L (ref 99–110)
CO2: 22 MMOL/L (ref 21–32)
CREAT SERPL-MCNC: 0.6 MG/DL (ref 0.6–1.1)
EKG ATRIAL RATE: 79 BPM
EKG DIAGNOSIS: NORMAL
EKG P AXIS: 23 DEGREES
EKG P-R INTERVAL: 148 MS
EKG Q-T INTERVAL: 386 MS
EKG QRS DURATION: 84 MS
EKG QTC CALCULATION (BAZETT): 442 MS
EKG R AXIS: 31 DEGREES
EKG T AXIS: 72 DEGREES
EKG VENTRICULAR RATE: 79 BPM
EOSINOPHILS ABSOLUTE: 0 K/UL (ref 0–0.6)
EOSINOPHILS RELATIVE PERCENT: 0.6 %
GFR AFRICAN AMERICAN: >60
GFR NON-AFRICAN AMERICAN: >60
GLOBULIN: 2.8 G/DL
GLUCOSE BLD-MCNC: 100 MG/DL (ref 70–99)
HCT VFR BLD CALC: 40.5 % (ref 36–48)
HEMOGLOBIN: 13.8 G/DL (ref 12–16)
LYMPHOCYTES ABSOLUTE: 1.9 K/UL (ref 1–5.1)
LYMPHOCYTES RELATIVE PERCENT: 24.1 %
MCH RBC QN AUTO: 29.9 PG (ref 26–34)
MCHC RBC AUTO-ENTMCNC: 34 G/DL (ref 31–36)
MCV RBC AUTO: 88 FL (ref 80–100)
MONOCYTES ABSOLUTE: 0.6 K/UL (ref 0–1.3)
MONOCYTES RELATIVE PERCENT: 7.6 %
NEUTROPHILS ABSOLUTE: 5.2 K/UL (ref 1.7–7.7)
NEUTROPHILS RELATIVE PERCENT: 66.5 %
PDW BLD-RTO: 13.2 % (ref 12.4–15.4)
PLATELET # BLD: 190 K/UL (ref 135–450)
PMV BLD AUTO: 10.4 FL (ref 5–10.5)
POTASSIUM SERPL-SCNC: 3.8 MMOL/L (ref 3.5–5.1)
RBC # BLD: 4.6 M/UL (ref 4–5.2)
SODIUM BLD-SCNC: 138 MMOL/L (ref 136–145)
TOTAL PROTEIN: 7.7 G/DL (ref 6.4–8.2)
TROPONIN: <0.01 NG/ML
WBC # BLD: 7.9 K/UL (ref 4–11)

## 2019-05-01 PROCEDURE — 93010 ELECTROCARDIOGRAM REPORT: CPT | Performed by: INTERNAL MEDICINE

## 2019-05-01 PROCEDURE — 80053 COMPREHEN METABOLIC PANEL: CPT

## 2019-05-01 PROCEDURE — 93005 ELECTROCARDIOGRAM TRACING: CPT | Performed by: EMERGENCY MEDICINE

## 2019-05-01 PROCEDURE — 99285 EMERGENCY DEPT VISIT HI MDM: CPT

## 2019-05-01 PROCEDURE — 84484 ASSAY OF TROPONIN QUANT: CPT

## 2019-05-01 PROCEDURE — 85025 COMPLETE CBC W/AUTO DIFF WBC: CPT

## 2019-05-01 ASSESSMENT — PAIN DESCRIPTION - LOCATION: LOCATION: CHEST

## 2019-05-01 ASSESSMENT — PAIN SCALES - GENERAL: PAINLEVEL_OUTOF10: 3

## 2019-05-01 ASSESSMENT — PAIN DESCRIPTION - PAIN TYPE: TYPE: ACUTE PAIN

## 2019-05-01 NOTE — ED PROVIDER NOTES
file   Social Needs    Financial resource strain: Not on file    Food insecurity:     Worry: Not on file     Inability: Not on file    Transportation needs:     Medical: Not on file     Non-medical: Not on file   Tobacco Use    Smoking status: Former Smoker     Packs/day: 0.25     Years: 17.00     Pack years: 4.25     Types: Cigarettes, E-Cigarettes     Start date: 5/1/2014    Smokeless tobacco: Never Used    Tobacco comment: vapes    Substance and Sexual Activity    Alcohol use: Yes     Alcohol/week: 7.2 oz     Types: 12 Cans of beer per week     Comment: 1-2 times a month states drank 1 glass wine 6/25/13 evening and prior to that 3 months ago    Drug use: No     Comment: used pot as teen tried cocaine as teen    Sexual activity: Not Currently     Partners: Male   Lifestyle    Physical activity:     Days per week: Not on file     Minutes per session: Not on file    Stress: Not on file   Relationships    Social connections:     Talks on phone: Not on file     Gets together: Not on file     Attends Synagogue service: Not on file     Active member of club or organization: Not on file     Attends meetings of clubs or organizations: Not on file     Relationship status: Not on file    Intimate partner violence:     Fear of current or ex partner: Not on file     Emotionally abused: Not on file     Physically abused: Not on file     Forced sexual activity: Not on file   Other Topics Concern    Not on file   Social History Narrative    Not on file     No current facility-administered medications for this encounter. Current Outpatient Medications   Medication Sig Dispense Refill    phentermine (ADIPEX-P) 37.5 MG tablet Take 1 tablet by mouth every morning (before breakfast) for 30 days.  30 tablet 0    levothyroxine (SYNTHROID) 100 MCG tablet Take 1 tablet by mouth Daily 90 tablet 2     Allergies   Allergen Reactions    Paxil [Paroxetine] Rash     EP's        REVIEW OF SYSTEMS  10 systems reviewed, pertinent positives per HPI otherwise noted to be negative     PHYSICAL EXAM  /84   Pulse 100   Temp 98 °F (36.7 °C) (Oral)   Resp 19   Ht 5' 4\" (1.626 m)   Wt 174 lb (78.9 kg)   LMP 01/10/2012   SpO2 100%   BMI 29.87 kg/m²   GENERAL APPEARANCE: Awake and alert. Cooperative. In no acute distress. HEAD: Normocephalic. Atraumatic. EYES: PERRL. EOM's grossly intact. ENT: Mucous membranes are pink and moist.   NECK: Supple. HEART: RRR. No murmurs. LUNGS: Respirations unlabored. CTAB. Good air exchange. ABDOMEN: Soft. Non-distended. Non-tender. No masses. No organomegaly. No guarding or rebound. EXTREMITIES: No peripheral edema. Moves all extremities equally. All extremities neurovascularly intact. SKIN: Warm and dry. No acute rashes. NEUROLOGICAL: Alert and oriented. Strength 5/5, sensation intact. Gait normal.   PSYCHIATRIC: Normal mood and affect. No HI or SI expressed to me. RADIOLOGY    See below     EKG:     See below      ED COURSE/MDM        ED Course as of May 01 1418   Wed May 01, 2019   1311 Comprehensive metabolic panel(!):    Sodium 138   Potassium 3.8   Chloride 103   CO2 22   Anion Gap 13   Glucose 100(!)   BUN 12   Creatinine 0.6   GFR Non- >60   GFR African American >60   Calcium 9.6   Total Protein 7.7   Albumin 4.9   Albumin/Globulin Ratio 1.8   Bilirubin 0.5   Alk Phos 74   ALT 16   AST 17   Globulin 2.8 [WL]   1312 Troponin:    Troponin <0.01 [WL]   1312 CBC auto differential:    WBC 7.9   RBC 4.60   Hemoglobin Quant 13.8   Hematocrit 40.5   MCV 88.0   MCH 29.9   MCHC 34.0   RDW 13.2   Platelet Count 975   MPV 10.4   Neutrophils % 66.5   Lymphocyte % 24.1   Monocytes % 7.6   Eosinophils % 0.6   Basophils % 1.2   Neutrophils # 5.2   Lymphocytes # 1.9   Monocytes # 0.6   Eosinophils # 0.0   Basophils # 0.1 [WL]   1312 Sinus rhythm at 79. Normal axis. . No acute ST-T changes.   Compared to prior June 26, 2013, unchanged from prior   EKG 12 Lead

## 2019-06-26 ENCOUNTER — TELEPHONE (OUTPATIENT)
Dept: ENDOCRINOLOGY | Age: 39
End: 2019-06-26

## 2019-06-26 NOTE — TELEPHONE ENCOUNTER
Spoke to patient and she said that the reaction was immediate after she starting taking this pill, the bottle does not have a manufacture name on it, she is going to call the pharmacy to try to find out who makes it and see if there is any way to get it filled again through someone different, in the mean time do you want her to have any labs done?  Patient saw Michelle Rodriguez and was told not to have them done until she was off the Addipex

## 2019-06-26 NOTE — TELEPHONE ENCOUNTER
Advise her to go ahead and repeat the labs now. Orders are in her chart from Tana Jacob. She is on generic levothyroxine which is made by different manufacturers. Will switch her to name brand synthroid after looking at the labs.

## 2019-06-28 ENCOUNTER — HOSPITAL ENCOUNTER (OUTPATIENT)
Age: 39
Discharge: HOME | End: 2019-06-28
Payer: COMMERCIAL

## 2019-06-28 DIAGNOSIS — E03.9 ACQUIRED HYPOTHYROIDISM: ICD-10-CM

## 2019-06-28 LAB
T4 FREE: 1.6 NG/DL (ref 0.9–1.8)
TSH SERPL DL<=0.05 MIU/L-ACNC: 1.12 UIU/ML (ref 0.27–4.2)

## 2019-06-28 PROCEDURE — 36415 COLL VENOUS BLD VENIPUNCTURE: CPT

## 2019-06-28 PROCEDURE — 84439 ASSAY OF FREE THYROXINE: CPT

## 2019-06-28 PROCEDURE — 84443 ASSAY THYROID STIM HORMONE: CPT

## 2019-09-11 ENCOUNTER — OFFICE VISIT (OUTPATIENT)
Dept: ENDOCRINOLOGY | Age: 39
End: 2019-09-11
Payer: COMMERCIAL

## 2019-09-11 VITALS
HEIGHT: 64 IN | DIASTOLIC BLOOD PRESSURE: 74 MMHG | WEIGHT: 159.6 LBS | HEART RATE: 74 BPM | OXYGEN SATURATION: 96 % | BODY MASS INDEX: 27.25 KG/M2 | SYSTOLIC BLOOD PRESSURE: 130 MMHG

## 2019-09-11 DIAGNOSIS — E55.9 VITAMIN D DEFICIENCY: Primary | ICD-10-CM

## 2019-09-11 DIAGNOSIS — E03.9 ACQUIRED HYPOTHYROIDISM: ICD-10-CM

## 2019-09-11 PROCEDURE — G8417 CALC BMI ABV UP PARAM F/U: HCPCS | Performed by: INTERNAL MEDICINE

## 2019-09-11 PROCEDURE — 1036F TOBACCO NON-USER: CPT | Performed by: INTERNAL MEDICINE

## 2019-09-11 PROCEDURE — 99213 OFFICE O/P EST LOW 20 MIN: CPT | Performed by: INTERNAL MEDICINE

## 2019-09-11 PROCEDURE — G8427 DOCREV CUR MEDS BY ELIG CLIN: HCPCS | Performed by: INTERNAL MEDICINE

## 2019-09-11 RX ORDER — LEVOTHYROXINE SODIUM 0.1 MG/1
100 TABLET ORAL DAILY
Qty: 30 TABLET | Refills: 3 | Status: CANCELLED | OUTPATIENT
Start: 2019-09-11

## 2019-09-11 ASSESSMENT — ENCOUNTER SYMPTOMS
PHOTOPHOBIA: 0
COUGH: 0
BACK PAIN: 0

## 2019-09-11 NOTE — PROGRESS NOTES
Thyromegaly present. Cardiovascular: Normal rate and normal heart sounds. Pulmonary/Chest: Effort normal. No respiratory distress. She has no wheezes. Abdominal: Soft. Bowel sounds are normal. There is no tenderness. Musculoskeletal: She exhibits no edema. Neurological: She is alert and oriented to person, place, and time. Skin: Skin is warm and dry. She is not diaphoretic. Psychiatric: Her behavior is normal. Thought content normal.     EXAMINATION:   THYROID ULTRASOUND       2/23/2019       COMPARISON:   None       HISTORY:   ORDERING SYSTEM PROVIDED HISTORY: Acquired hypothyroidism   TECHNOLOGIST PROVIDED HISTORY:   Reason for exam:->Hypothyroidism. Thyromegaly.       FINDINGS:   Right thyroid lobe:  4.0 x 1.5 x 1.2 cm       Left thyroid lobe:  4.2 x 1.3 x 1.1 cm       Isthmus:  3 mm       Thyroid Gland:  Thyroid gland demonstrates normal echotexture and vascularity.       Nodules: No thyroid nodules are present.       Cervical lymphadenopathy: No abnormal lymph nodes in the imaged portions of   the neck.           Impression   Unremarkable thyroid ultrasound.                   Assessment/Plan      1. Hypothyroidism     This 41 yrs old female has hypothyroidism . She is currently on levothyroxine 100 mcg daily. She has lost significant weight with life style changes    TSH , Free T4 was normal in 06/19    Will repeat her labs today      2. Vitamin D def. Vitamin D 25.5    Will repeat    3. Anxiety.  As per PCP      Follow-up in 6 months with Maira Garay

## 2019-09-16 ENCOUNTER — HOSPITAL ENCOUNTER (OUTPATIENT)
Age: 39
Discharge: HOME | End: 2019-09-16
Payer: COMMERCIAL

## 2019-09-16 DIAGNOSIS — E55.9 VITAMIN D DEFICIENCY: ICD-10-CM

## 2019-09-16 DIAGNOSIS — E03.9 ACQUIRED HYPOTHYROIDISM: ICD-10-CM

## 2019-09-16 LAB
T4 FREE: 1.5 NG/DL (ref 0.9–1.8)
TSH SERPL DL<=0.05 MIU/L-ACNC: 0.72 UIU/ML (ref 0.27–4.2)
VITAMIN D 25-HYDROXY: 38.6 NG/ML

## 2019-09-16 PROCEDURE — 36415 COLL VENOUS BLD VENIPUNCTURE: CPT

## 2019-09-16 PROCEDURE — 84443 ASSAY THYROID STIM HORMONE: CPT

## 2019-09-16 PROCEDURE — 82306 VITAMIN D 25 HYDROXY: CPT

## 2019-09-16 PROCEDURE — 84439 ASSAY OF FREE THYROXINE: CPT

## 2019-09-30 ENCOUNTER — TELEPHONE (OUTPATIENT)
Dept: ENDOCRINOLOGY | Age: 39
End: 2019-09-30

## 2020-03-10 ENCOUNTER — OFFICE VISIT (OUTPATIENT)
Dept: ENDOCRINOLOGY | Age: 40
End: 2020-03-10
Payer: COMMERCIAL

## 2020-03-10 VITALS
DIASTOLIC BLOOD PRESSURE: 76 MMHG | RESPIRATION RATE: 14 BRPM | HEART RATE: 84 BPM | BODY MASS INDEX: 27.14 KG/M2 | HEIGHT: 64 IN | SYSTOLIC BLOOD PRESSURE: 112 MMHG | OXYGEN SATURATION: 99 % | WEIGHT: 159 LBS

## 2020-03-10 PROCEDURE — G8417 CALC BMI ABV UP PARAM F/U: HCPCS | Performed by: NURSE PRACTITIONER

## 2020-03-10 PROCEDURE — G8427 DOCREV CUR MEDS BY ELIG CLIN: HCPCS | Performed by: NURSE PRACTITIONER

## 2020-03-10 PROCEDURE — 99213 OFFICE O/P EST LOW 20 MIN: CPT | Performed by: NURSE PRACTITIONER

## 2020-03-10 PROCEDURE — G8484 FLU IMMUNIZE NO ADMIN: HCPCS | Performed by: NURSE PRACTITIONER

## 2020-03-10 PROCEDURE — 1036F TOBACCO NON-USER: CPT | Performed by: NURSE PRACTITIONER

## 2020-03-10 RX ORDER — ALPRAZOLAM 0.5 MG/1
0.5 TABLET ORAL PRN
COMMUNITY

## 2020-03-10 RX ORDER — LEVOTHYROXINE SODIUM 0.1 MG/1
100 TABLET ORAL DAILY
Qty: 90 TABLET | Refills: 2 | Status: SHIPPED | OUTPATIENT
Start: 2020-03-10 | End: 2021-02-01

## 2020-03-10 RX ORDER — BUSPIRONE HYDROCHLORIDE 10 MG/1
TABLET ORAL
COMMUNITY
Start: 2020-02-07

## 2020-03-10 ASSESSMENT — ENCOUNTER SYMPTOMS
CONSTIPATION: 0
ABDOMINAL PAIN: 0
SHORTNESS OF BREATH: 0
DIARRHEA: 0
COLOR CHANGE: 0
EYE PAIN: 0
BACK PAIN: 0

## 2020-03-10 NOTE — PROGRESS NOTES
Endocrinology  Quiana Daniels, [address] [facility]  Phone: [phone]   FAX: [phone]    Heriberto Pettit is a 46 y.o. female who presents for evaluation of  hypothyroidism. Thyroid:   Heriberto Pettit has a h/o hypothyroidism for > 2 years. Referring Provider: Kassandra Houston MD     Last A1C:   Lab Results   Component Value Date    LABA1C 5.0 04/01/2019     Last BP Readings:   BP Readings from Last 3 Encounters:   03/10/20 112/76   09/11/19 130/74   05/01/19 135/84     Last LDL:   Lab Results   Component Value Date    LDLCALC 118 (H) 04/01/2019     Aspirin Use: no    Tobacco/Alcohol History:   Tobacco Use    Smoking status: Former Smoker     Packs/day: 0.25     Years: 17.00     Pack years: 4.25     Types: Cigarettes     Start date: 5/1/2014    Smokeless tobacco: Never Used    Alcohol use: Yes     Alcohol/week: 7.2 oz     Types: As below     Comment: 1-2 times a month states drank 1 glass wine 6/25/13 evening and prior to that 3 months ago    Drug use: No     Comment: used pot as teen tried cocaine as teen     Patient has been on varying dosages of levothyroxine and is currently on levothyroxine 100 mcg. Confirms that it is taken in early am on an empty stomach. Clarified that nothing to eat for at least 30 minutes after and no iron or calcium for at.least 3-4 hours after. Diagnosis was intially during last pregnancy but was euthyroid shortly after and has been conssitently taking eds for 2 years     Current symptoms include fatigue, weight gain, constipation, change in skin,  nails, or hair, goiter. Reports a near 40 lbs weight gain over the past 2 years that has not responded to dietary management. --> reports a 10+ lbs weight loss in the past 3 weeks  Patient denies swelling, anxiousness, feeling excessive energy, tremulousness, palpitations, weight loss, diarrhea, heat intolerance.      Obstructive symptoms  - Change in voice no  - Trouble swallowing no    Predisposing factors for thyroid disease  Exposure to radiation (neck) : no  Exposure to iodine : no  FH of thyroid disease : no  FH of thyroid or other cancers: no     Dietary Regimen  BF: eggs/coffee  Lunch: skips/late breakfast  Dinner: protein/vegetables/carbs  Beverages: coffee/water/rarely alcohol/  Snacks: nuts    Lab Results   Component Value Date    TSH 0.72 09/16/2019    TSH 1.12 06/28/2019    TSH 0.48 02/23/2019    T4FREE 1.5 09/16/2019    T4FREE 1.6 06/28/2019    T4FREE 1.5 02/23/2019     Component      Latest Ref Rng & Units 2/23/2019   THYROID PEROXIDASE (TPO) ABS      <34 IU/mL 227 (H)     EXAMINATION:  THYROID ULTRASOUND 2/23/2019    COMPARISON: None    HISTORY:  ORDERING SYSTEM PROVIDED HISTORY: Acquired hypothyroidism   TECHNOLOGIST PROVIDED HISTORY:  Reason for exam:->Hypothyroidism. Thyromegaly. FINDINGS:  Right thyroid lobe:  4.0 x 1.5 x 1.2 cm  Left thyroid lobe:  4.2 x 1.3 x 1.1 cm  Isthmus:  3 mm  Thyroid Gland:  Thyroid gland demonstrates normal echotexture and vascularity. Nodules: No thyroid nodules are present. Cervical lymphadenopathy: No abnormal lymph nodes in the imaged portions of  the neck. Impression   Unremarkable thyroid ultrasound. Vitamin D deficiency  Ongoing concerns with fatigue and insomnia  Component      Latest Ref Rng & Units 4/1/2019   Vit D, 25-Hydroxy      >=30 ng/mL 25.5 (L)     Hyperlipidemia  Not on a statin currently, monitoring per her diet currently    Component      Latest Ref Rng & Units 4/1/2019 6/5/2014   Cholesterol, Total      0 - 199 mg/dL 178 178   Triglycerides      0 - 150 mg/dL 70 65   HDL Cholesterol      40 - 60 mg/dL 46 65 (H)   LDL Calculated      <100 mg/dL 118 (H) 100 (H)   VLDL Cholesterol Calculated      Not Established mg/dL 14 13     Past Medical History:   Diagnosis Date    Anxiety     Herniated cervical disc     MRSA infection     on back 2010    Thyroid disease      No family history on file.   Current Outpatient Medications   Medication Sig Dispense Refill    busPIRone (BUSPAR) 10 MG tablet       ALPRAZolam (XANAX) 0.5 MG tablet Take 0.5 mg by mouth as needed for Sleep.  levothyroxine (SYNTHROID) 100 MCG tablet Take 1 tablet by mouth Daily 90 tablet 2     No current facility-administered medications for this visit. Review of Systems   Constitutional: Negative for activity change, appetite change, diaphoresis, fatigue and unexpected weight change. Eyes: Negative for pain and visual disturbance. Respiratory: Negative for shortness of breath. Cardiovascular: Negative for palpitations and leg swelling. Gastrointestinal: Negative for abdominal pain, constipation and diarrhea. Endocrine: Negative for cold intolerance, heat intolerance, polydipsia, polyphagia and polyuria. Musculoskeletal: Negative for back pain, gait problem, myalgias and neck pain. Skin: Negative for color change and pallor. Neurological: Negative for dizziness, weakness, light-headedness and headaches. Hematological: Does not bruise/bleed easily. Psychiatric/Behavioral: Negative for sleep disturbance. The patient is not nervous/anxious and is not hyperactive. Vitals:    03/10/20 1555   BP: 112/76   Pulse: 84   Resp: 14   SpO2: 99%   Weight: 159 lb (72.1 kg)   Height: 5' 4\" (1.626 m)     Physical Exam  Constitutional:       Appearance: She is well-developed. She is not diaphoretic. HENT:      Head: Normocephalic and atraumatic. Eyes:      Conjunctiva/sclera: Conjunctivae normal.      Pupils: Pupils are equal, round, and reactive to light. Comments: No evidence of proptosis   Neck:      Musculoskeletal: Normal range of motion and neck supple. Thyroid: No thyromegaly. Cardiovascular:      Rate and Rhythm: Normal rate and regular rhythm. Pulmonary:      Effort: Pulmonary effort is normal.      Breath sounds: Normal breath sounds. Abdominal:      General: Bowel sounds are normal.      Palpations: Abdomen is soft. Musculoskeletal: Normal range of motion. General: No tenderness. Skin:     General: Skin is warm and dry. Comments: No skin changes b/l shins   Neurological:      Mental Status: She is alert and oriented to person, place, and time. Comments: No tremors noted on outstretched arms   Psychiatric:         Behavior: Behavior normal.         Thought Content: Thought content normal.         Judgment: Judgment normal.       Assessment  Bethel Nageotte is a 46 y.o. female with abnormal thyroid test:   Previous test were normal, will repeat evaluation by checking thyroid test and antibodies  Interested in weight loss advice: discussed starting a low carb protocol  Will review labs, and medical records, currently not available from [address] [place] [address] [place]  Reports doing well on Phentermine and documened a 22 + lbs weight loss in 8 weeks via diet and medication. Plan  Problem List Items Addressed This Visit     Acquired hypothyroidism - Primary    Relevant Medications    levothyroxine (SYNTHROID) 100 MCG tablet    Other Relevant Orders    TSH without Reflex    T4, Free      Greater than 20 minutes spent directly counseling patient about topics listed above (such as lifestyle modifications, preventative screenings and/or disease related processes)  Return in about 3 months (around 6/10/2020).

## 2020-09-17 ENCOUNTER — VIRTUAL VISIT (OUTPATIENT)
Dept: ENDOCRINOLOGY | Age: 40
End: 2020-09-17
Payer: COMMERCIAL

## 2020-09-17 PROBLEM — E66.9 OBESITY WITH SERIOUS COMORBIDITY: Status: RESOLVED | Noted: 2019-03-06 | Resolved: 2020-09-17

## 2020-09-17 PROCEDURE — G8427 DOCREV CUR MEDS BY ELIG CLIN: HCPCS | Performed by: NURSE PRACTITIONER

## 2020-09-17 PROCEDURE — G8417 CALC BMI ABV UP PARAM F/U: HCPCS | Performed by: NURSE PRACTITIONER

## 2020-09-17 PROCEDURE — 99213 OFFICE O/P EST LOW 20 MIN: CPT | Performed by: NURSE PRACTITIONER

## 2020-09-17 PROCEDURE — 1036F TOBACCO NON-USER: CPT | Performed by: NURSE PRACTITIONER

## 2020-09-17 RX ORDER — LEVOTHYROXINE SODIUM 100 MCG
100 TABLET ORAL DAILY
Qty: 30 TABLET | Refills: 3 | Status: SHIPPED | OUTPATIENT
Start: 2020-09-17 | End: 2021-02-01

## 2020-09-17 RX ORDER — ZINC GLUCONATE 50 MG
1 TABLET ORAL
COMMUNITY

## 2020-09-17 RX ORDER — TIZANIDINE 4 MG/1
TABLET ORAL
COMMUNITY
Start: 2019-09-23

## 2020-09-17 RX ORDER — MULTIVIT-MIN/IRON/FOLIC ACID/K 18-600-40
CAPSULE ORAL
COMMUNITY

## 2020-09-17 ASSESSMENT — ENCOUNTER SYMPTOMS
EYE PAIN: 0
ABDOMINAL PAIN: 0
SHORTNESS OF BREATH: 0
DIARRHEA: 0
BACK PAIN: 0
COLOR CHANGE: 0
CONSTIPATION: 0

## 2020-09-17 NOTE — PROGRESS NOTES
Endocrinology  Ce Mckenna, CNP, [address] [facility] [address] [facility], [address]  Phone [phone]  Fax [phone]    Giovanna Beckham is  being evaluated by a Virtual Visit (video visit) encounter to address concerns as mentioned above. Due to this being a TeleHealth encounter (During [facility] public health emergency), evaluation of the following organ systems was limited: Vitals/Constitutional/EENT/Resp/CV/GI//MS/Neuro/Skin/Heme-Lymph-Imm. Pursuant to the emergency declaration under the [address] [place] [address], [address] [place] [address] authority and the [employer] Act, this Virtual Visit was conducted with patient's (and/or legal guardian's) consent, to reduce the patient's risk of exposure to COVID-19 and provide necessary medical care. The patient (and/or legal guardian) has also been advised to contact this office for worsening conditions or problems, and seek emergency medical treatment and/or call 911 if deemed necessary. Services were provided through a video synchronous discussion virtually to substitute for in-person clinic visit. Patient and provider were located at their individual homes    Patient was identified via name,  on the video visit    Giovanna Beckham is a 46 y.o. female who presents for evaluation of  hypothyroidism. Thyroid:   Giovanna Beckham has a h/o hypothyroidism for > 2 years.     Referring Provider: Nestor Willett MD     Last A1C:   Lab Results   Component Value Date    LABA1C 5.0 2019     Last BP Readings:   BP Readings from Last 3 Encounters:   03/10/20 112/76   19 130/74   19 135/84     Last LDL:   Lab Results   Component Value Date    LDLCALC 118 (H) 2019     Aspirin Use: no    Tobacco/Alcohol History:   Tobacco Use    Smoking status: Former Smoker     Packs/day: 0.25     Years: 17.00     Pack years: 4.25     Types: Cigarettes     Start date: 5/1/2014    Smokeless tobacco: Never Used    Alcohol use: Yes     Alcohol/week: 7.2 oz     Types: As below     Comment: 1-2 times a month states drank 1 glass wine 6/25/13 evening and prior to that 3 months ago    Drug use: No     Comment: used pot as teen tried cocaine as teen     Patient has been on varying dosages of levothyroxine and is currently on levothyroxine 100 mcg. Confirms that it is taken in early am on an empty stomach. Clarified that nothing to eat for at least 30 minutes after and no iron or calcium for at.least 3-4 hours after. Diagnosis was intially during last pregnancy but was euthyroid shortly after and has been conssitently taking eds for 2 years     Current symptoms include fatigue, weight gain, constipation, change in skin,  nails, or hair, goiter. Reports a near 40 lbs weight gain over the past 2 years that has not responded to dietary management. --> reports a 10+ lbs weight loss in the past 3 weeks  Patient denies swelling, anxiousness, feeling excessive energy, tremulousness, palpitations, weight loss, diarrhea, heat intolerance.      Obstructive symptoms  - Change in voice no  - Trouble swallowing no    Predisposing factors for thyroid disease  Exposure to radiation (neck) : no  Exposure to iodine : no  FH of thyroid disease : no  FH of thyroid or other cancers: no     Dietary Regimen  BF: eggs/coffee  Lunch: skips/late breakfast  Dinner: protein/vegetables/carbs  Beverages: coffee/water/rarely alcohol/  Snacks: nuts    Lab Results   Component Value Date    TSH 1.09 03/10/2020    TSH 0.72 09/16/2019    TSH 1.12 06/28/2019    T4FREE 1.3 03/10/2020    T4FREE 1.5 09/16/2019    T4FREE 1.6 06/28/2019     Component      Latest Ref Rng & Units 2/23/2019   THYROID PEROXIDASE (TPO) ABS      <34 IU/mL 227 (H)     EXAMINATION:  THYROID ULTRASOUND 2/23/2019    COMPARISON: None    HISTORY:  ORDERING SYSTEM PROVIDED HISTORY: Acquired hypothyroidism   TECHNOLOGIST PROVIDED HISTORY:  Reason for exam:->Hypothyroidism. Thyromegaly. FINDINGS:  Right thyroid lobe:  4.0 x 1.5 x 1.2 cm  Left thyroid lobe:  4.2 x 1.3 x 1.1 cm  Isthmus:  3 mm  Thyroid Gland:  Thyroid gland demonstrates normal echotexture and vascularity. Nodules: No thyroid nodules are present. Cervical lymphadenopathy: No abnormal lymph nodes in the imaged portions of  the neck. Impression   Unremarkable thyroid ultrasound. Vitamin D deficiency  Ongoing concerns with fatigue and insomnia  Component  Latest Ref Rng & Units 4/1/2019   Vit D, 25-Hydroxy   >=30 ng/mL 25.5 (L)     Hyperlipidemia  Not on a statin currently, monitoring per her diet currently    Component      Latest Ref Rng & Units 4/1/2019 6/5/2014   Cholesterol, Total      0 - 199 mg/dL 178 178   Triglycerides      0 - 150 mg/dL 70 65   HDL Cholesterol      40 - 60 mg/dL 46 65 (H)   LDL Calculated      <100 mg/dL 118 (H) 100 (H)   VLDL Cholesterol Calculated      Not Established mg/dL 14 13     Past Medical History:   Diagnosis Date    Anxiety     Herniated cervical disc     MRSA infection     on back 2010    Thyroid disease      History reviewed. No pertinent family history. Current Outpatient Medications   Medication Sig Dispense Refill    zinc 50 MG TABS tablet Take 1 capsule by mouth      tiZANidine (ZANAFLEX) 4 MG tablet take 1-2 by mouth nightly at bedtime      Multiple Vitamins-Minerals (MULTIVITAMIN ADULT PO) Take by mouth      Cholecalciferol (VITAMIN D) 50 MCG (2000 UT) CAPS capsule Take by mouth      SYNTHROID 100 MCG tablet Take 1 tablet by mouth Daily 30 tablet 3    busPIRone (BUSPAR) 10 MG tablet       ALPRAZolam (XANAX) 0.5 MG tablet Take 0.5 mg by mouth as needed for Sleep.  levothyroxine (SYNTHROID) 100 MCG tablet Take 1 tablet by mouth Daily 90 tablet 2     No current facility-administered medications for this visit.        Review of Systems   Constitutional: Negative for activity change, appetite change, diaphoresis, fatigue and unexpected weight change. Eyes: Negative for pain and visual disturbance. Respiratory: Negative for shortness of breath. Cardiovascular: Negative for palpitations and leg swelling. Gastrointestinal: Negative for abdominal pain, constipation and diarrhea. Endocrine: Negative for cold intolerance, heat intolerance, polydipsia, polyphagia and polyuria. Musculoskeletal: Negative for back pain, gait problem, myalgias and neck pain. Skin: Negative for color change and pallor. Neurological: Negative for dizziness, weakness, light-headedness and headaches. Hematological: Does not bruise/bleed easily. Psychiatric/Behavioral: Negative for sleep disturbance. The patient is nervous/anxious. The patient is not hyperactive. Anxiety when switching Levothyroxine from one  to another     There were no vitals filed for this visit.  televisit    Physical Exam televisit    Assessment  Antonio Rose is a 46 y.o. female with abnormal thyroid test:   Previous test were normal, will repeat evaluation by checking thyroid test and antibodies  Interested in weight loss advice: discussed starting a low carb protocol  Will review labs, and medical records, currently not available from [facility]  Problem List Items Addressed This Visit     Acquired hypothyroidism - Primary     Repeat TFT in 4-6 weeks  Further management based on results  Ensure off of biotin for 2-3 days prior to labs           Relevant Medications    SYNTHROID 100 MCG tablet    Other Relevant Orders    T3, Free    T4, Free    TSH without Reflex    RESOLVED: Obesity with serious comorbidity    Vitamin D deficiency     Lab Results   Component Value Date    VITD25 38.6 09/16/2019     Continue to supplement  Recheck levels in 6 months           Greater than 20 minutes spent directly counseling patient about topics listed above (such as lifestyle modifications, preventative screenings and/or disease related processes)  No follow-ups on file.

## 2020-09-17 NOTE — ASSESSMENT & PLAN NOTE
Lab Results   Component Value Date    VITD25 38.6 09/16/2019     Continue to supplement  Recheck levels in 6 months

## 2021-01-29 DIAGNOSIS — E03.9 ACQUIRED HYPOTHYROIDISM: ICD-10-CM

## 2021-02-01 RX ORDER — LEVOTHYROXINE SODIUM 0.1 MG/1
100 TABLET ORAL DAILY
Qty: 30 TABLET | Refills: 0 | Status: SHIPPED | OUTPATIENT
Start: 2021-02-01

## 2021-02-01 NOTE — TELEPHONE ENCOUNTER
Medication:   Requested Prescriptions     Pending Prescriptions Disp Refills    levothyroxine (SYNTHROID) 100 MCG tablet [Pharmacy Med Name: *LEVOTHYROXINE 100 MCG TABLET] 30 tablet 0     Sig: Take 1 tablet by mouth Daily       Last Filled:      Patient Phone Number: [phone] (home)     Last appt: 3/10/2020   Next appt: Visit date not found    Last Thyroid:   Lab Results   Component Value Date    TSH 1.09 03/10/2020    T4FREE 1.3 03/10/2020

## 2022-10-24 ENCOUNTER — HOSPITAL ENCOUNTER (OUTPATIENT)
Dept: MAMMOGRAPHY | Age: 42
Discharge: HOME | End: 2022-10-24
Payer: COMMERCIAL

## 2022-10-24 ENCOUNTER — APPOINTMENT (OUTPATIENT)
Dept: ULTRASOUND IMAGING | Age: 42
End: 2022-10-24
Payer: COMMERCIAL

## 2022-10-24 DIAGNOSIS — N63.21 MASS OF UPPER OUTER QUADRANT OF LEFT BREAST: ICD-10-CM

## 2022-10-24 PROCEDURE — G0279 TOMOSYNTHESIS, MAMMO: HCPCS
